# Patient Record
Sex: FEMALE | URBAN - METROPOLITAN AREA
[De-identification: names, ages, dates, MRNs, and addresses within clinical notes are randomized per-mention and may not be internally consistent; named-entity substitution may affect disease eponyms.]

---

## 2019-01-30 ENCOUNTER — NEW PATIENT (OUTPATIENT)
Dept: URBAN - METROPOLITAN AREA CLINIC 56 | Facility: CLINIC | Age: 64
End: 2019-01-30
Payer: COMMERCIAL

## 2019-01-30 DIAGNOSIS — E11.3513 DIABETES MELLITUS TYPE 2 WITH PROLIFERATIVE RETINO: Primary | ICD-10-CM

## 2019-01-30 PROCEDURE — 92250 FUNDUS PHOTOGRAPHY W/I&R: CPT | Performed by: OPTOMETRIST

## 2019-01-30 PROCEDURE — 92004 COMPRE OPH EXAM NEW PT 1/>: CPT | Performed by: OPTOMETRIST

## 2019-01-30 ASSESSMENT — VISUAL ACUITY
OD: 20/20
OS: 20/25

## 2019-01-30 ASSESSMENT — INTRAOCULAR PRESSURE
OD: 17
OS: 17

## 2019-01-30 ASSESSMENT — KERATOMETRY
OS: 42.78
OD: 42.81

## 2019-02-06 ENCOUNTER — FOLLOW UP ESTABLISHED (OUTPATIENT)
Dept: URBAN - METROPOLITAN AREA CLINIC 56 | Facility: CLINIC | Age: 64
End: 2019-02-06
Payer: COMMERCIAL

## 2019-02-06 PROCEDURE — 92134 CPTRZ OPH DX IMG PST SGM RTA: CPT | Performed by: OPHTHALMOLOGY

## 2019-02-06 PROCEDURE — 92235 FLUORESCEIN ANGRPH MLTIFRAME: CPT | Performed by: OPHTHALMOLOGY

## 2019-02-06 PROCEDURE — 92004 COMPRE OPH EXAM NEW PT 1/>: CPT | Performed by: OPHTHALMOLOGY

## 2019-02-06 ASSESSMENT — INTRAOCULAR PRESSURE
OS: 18
OD: 22

## 2019-02-18 ENCOUNTER — FOLLOW UP ESTABLISHED (OUTPATIENT)
Dept: URBAN - METROPOLITAN AREA CLINIC 56 | Facility: CLINIC | Age: 64
End: 2019-02-18
Payer: COMMERCIAL

## 2019-02-18 DIAGNOSIS — E11.3412 TYPE 2 DIAB WITH SEVERE NONP RTNOP WITH MACULAR EDEMA, L EYE: ICD-10-CM

## 2019-02-18 PROCEDURE — 67210 TREATMENT OF RETINAL LESION: CPT | Performed by: OPHTHALMOLOGY

## 2019-02-18 PROCEDURE — 92134 CPTRZ OPH DX IMG PST SGM RTA: CPT | Performed by: OPHTHALMOLOGY

## 2019-02-18 ASSESSMENT — INTRAOCULAR PRESSURE
OD: 23
OS: 20

## 2019-03-06 ENCOUNTER — FOLLOW UP ESTABLISHED (OUTPATIENT)
Dept: URBAN - METROPOLITAN AREA CLINIC 56 | Facility: CLINIC | Age: 64
End: 2019-03-06
Payer: COMMERCIAL

## 2019-03-06 DIAGNOSIS — Z79.84 LONG TERM (CURRENT) USE OF ORAL HYPOGLYCEMIC DRUGS: ICD-10-CM

## 2019-03-06 PROCEDURE — 92134 CPTRZ OPH DX IMG PST SGM RTA: CPT | Performed by: OPHTHALMOLOGY

## 2019-03-06 PROCEDURE — 67210 TREATMENT OF RETINAL LESION: CPT | Performed by: OPHTHALMOLOGY

## 2019-03-06 ASSESSMENT — INTRAOCULAR PRESSURE
OS: 16
OD: 15

## 2019-03-18 ENCOUNTER — FOLLOW UP ESTABLISHED (OUTPATIENT)
Dept: URBAN - METROPOLITAN AREA CLINIC 56 | Facility: CLINIC | Age: 64
End: 2019-03-18
Payer: COMMERCIAL

## 2019-03-18 PROCEDURE — 67228 TREATMENT X10SV RETINOPATHY: CPT | Performed by: OPHTHALMOLOGY

## 2019-03-18 PROCEDURE — 67028 INJECTION EYE DRUG: CPT | Performed by: OPHTHALMOLOGY

## 2019-03-18 PROCEDURE — 92134 CPTRZ OPH DX IMG PST SGM RTA: CPT | Performed by: OPHTHALMOLOGY

## 2019-03-18 ASSESSMENT — INTRAOCULAR PRESSURE
OS: 15
OD: 18

## 2019-04-03 ENCOUNTER — FOLLOW UP ESTABLISHED (OUTPATIENT)
Dept: URBAN - METROPOLITAN AREA CLINIC 56 | Facility: CLINIC | Age: 64
End: 2019-04-03
Payer: COMMERCIAL

## 2019-04-03 DIAGNOSIS — E11.3411 TYPE 2 DIAB WITH SEVERE NONP RTNOP WITH MACULAR EDEMA, R EYE: Primary | ICD-10-CM

## 2019-04-03 PROCEDURE — 92134 CPTRZ OPH DX IMG PST SGM RTA: CPT | Performed by: OPHTHALMOLOGY

## 2019-04-03 PROCEDURE — 67228 TREATMENT X10SV RETINOPATHY: CPT | Performed by: OPHTHALMOLOGY

## 2019-04-03 ASSESSMENT — INTRAOCULAR PRESSURE
OS: 18
OD: 19

## 2021-11-18 ENCOUNTER — OFFICE VISIT (OUTPATIENT)
Dept: URBAN - METROPOLITAN AREA CLINIC 8 | Facility: CLINIC | Age: 66
End: 2021-11-18
Payer: MEDICARE

## 2021-11-18 DIAGNOSIS — E11.9 DIABETES MELLITUS TYPE 2 WITHOUT MENTION OF COMPLICATION: ICD-10-CM

## 2021-11-18 DIAGNOSIS — H35.373 PUCKERING OF MACULA, BILATERAL: Primary | ICD-10-CM

## 2021-11-18 PROCEDURE — 99214 OFFICE O/P EST MOD 30 MIN: CPT | Performed by: OPHTHALMOLOGY

## 2021-11-18 PROCEDURE — 92134 CPTRZ OPH DX IMG PST SGM RTA: CPT | Performed by: OPHTHALMOLOGY

## 2021-11-18 ASSESSMENT — VISUAL ACUITY
OS: 20/40
OD: 20/25

## 2021-11-18 ASSESSMENT — INTRAOCULAR PRESSURE
OD: 20
OS: 20

## 2021-11-18 NOTE — IMPRESSION/PLAN
Impression: Puckering of macula, bilateral: H35.373. Plan: Recommend retina referral. Advised risks and benefits.

## 2021-11-18 NOTE — IMPRESSION/PLAN
Impression: Diabetes mellitus Type 2 without mention of complication: I62.1. Plan: Best treatment is to keep sugar under good control. Regular visits with PCP.

## 2021-11-23 ENCOUNTER — OFFICE VISIT (OUTPATIENT)
Dept: URBAN - METROPOLITAN AREA CLINIC 13 | Facility: CLINIC | Age: 66
End: 2021-11-23
Payer: MEDICARE

## 2021-11-23 DIAGNOSIS — Z96.1 PRESENCE OF INTRAOCULAR LENS: ICD-10-CM

## 2021-11-23 PROCEDURE — 92134 CPTRZ OPH DX IMG PST SGM RTA: CPT | Performed by: OPHTHALMOLOGY

## 2021-11-23 PROCEDURE — 92235 FLUORESCEIN ANGRPH MLTIFRAME: CPT | Performed by: OPHTHALMOLOGY

## 2021-11-23 PROCEDURE — 99204 OFFICE O/P NEW MOD 45 MIN: CPT | Performed by: OPHTHALMOLOGY

## 2021-11-23 ASSESSMENT — INTRAOCULAR PRESSURE
OS: 14
OD: 18

## 2021-11-23 NOTE — IMPRESSION/PLAN
Impression: Type 2 diab with severe nonp rtnop with macular edema, l eye: I91.7066 Left. Plan: --exam/OCT show center-involved DME
--FA shows perifoveal and juxtafoveal leaking MAs, no NVD/NVE
--r/b/a of anti-VEGF, focal laser, steroids, obs discussed --pt understands Avastin is considered off label for intraocular use --pt elects to proceed with Avastin, performed w/o complication
--plan series of 3 consecutive monthly injections followed by focal/grid laser --importance of tight BS/BP/lipid control discussed
--coordinate care with PCP to reduce systemic complications of DM

## 2021-11-23 NOTE — IMPRESSION/PLAN
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  Plan: --broad VMA with macular traction OD
--will consider PPV with MP at a later date

## 2021-11-23 NOTE — IMPRESSION/PLAN
Impression: Presence of intraocular lens: Z96.1.  Bilateral. Plan: --stable and centered OU, observe

## 2021-11-23 NOTE — IMPRESSION/PLAN
Impression: Type 2 diab with prolif diab rtnop with macular edema, r eye: E11.3511 Right. Plan: --exam/OCT shows center-involved DME OD
--FA shows leaking MAs, NVE, peripheral non-perfusion
--r/b/a of anti-VEGF, focal laser, PRP, steroids, obs discussed --pt understands Avastin is considered off label for intraocular use --pt elects to proceed with Avastin, performed w/o complication
--plan series of 3 consecutive monthly injections followed by PRP
--importance of tight BS/BP/lipid control discussed
--coordinate care with PCP to reduce systemic complications of DM

RTC 1 month for Avastin OU 2/3

## 2021-12-21 ENCOUNTER — PROCEDURE (OUTPATIENT)
Dept: URBAN - METROPOLITAN AREA CLINIC 13 | Facility: CLINIC | Age: 66
End: 2021-12-21
Payer: MEDICARE

## 2021-12-21 ASSESSMENT — INTRAOCULAR PRESSURE
OD: 17
OS: 15

## 2022-02-01 ENCOUNTER — PROCEDURE (OUTPATIENT)
Dept: URBAN - METROPOLITAN AREA CLINIC 13 | Facility: CLINIC | Age: 67
End: 2022-02-01
Payer: MEDICARE

## 2022-02-01 DIAGNOSIS — E11.3412 TYPE 2 DIABETES MELLITUS WITH SEVERE NONPROLIFERATIVE DIABETIC RETINOPATHY WITH MACULAR EDEMA, LEFT EYE: ICD-10-CM

## 2022-02-01 ASSESSMENT — INTRAOCULAR PRESSURE
OD: 21
OS: 20

## 2022-03-01 ENCOUNTER — OFFICE VISIT (OUTPATIENT)
Dept: URBAN - METROPOLITAN AREA CLINIC 13 | Facility: CLINIC | Age: 67
End: 2022-03-01
Payer: MEDICARE

## 2022-03-01 DIAGNOSIS — H43.821 VITREOMACULAR ADHESION, RIGHT EYE: ICD-10-CM

## 2022-03-01 DIAGNOSIS — E11.3511 TYPE 2 DIAB WITH PROLIF DIAB RTNOP WITH MACULAR EDEMA, R EYE: Primary | ICD-10-CM

## 2022-03-01 PROCEDURE — 92134 CPTRZ OPH DX IMG PST SGM RTA: CPT | Performed by: OPHTHALMOLOGY

## 2022-03-01 PROCEDURE — 99213 OFFICE O/P EST LOW 20 MIN: CPT | Performed by: OPHTHALMOLOGY

## 2022-03-01 ASSESSMENT — INTRAOCULAR PRESSURE
OD: 16
OS: 18

## 2022-03-01 NOTE — IMPRESSION/PLAN
Impression: Type 2 diab with prolif diab rtnop with macular edema, r eye: E11.3511 Right.
s/p Avastin OU x 3, last 02/01/22 Plan: --exam/OCT shows center-involved DME OD, improving s/p Avastin x 3
--FA from 11/23/21 showed leaking MAs, NVE, peripheral non-perfusion
--r/b/a of anti-VEGF, focal laser, PRP, steroids, obs discussed --pt understands Avastin is considered off label for intraocular use --pt elects to proceed with Avastin #4, performed w/o complication
--will require PRP OD at a later date --importance of tight BS/BP/lipid control discussed
--coordinate care with PCP to reduce systemic complications of DM

RTC 1 month for OCT OU re-eval Avastin vs Ozurdex

## 2022-03-01 NOTE — IMPRESSION/PLAN
Impression: Type 2 diab with severe nonp rtnop with macular edema, l eye: T73.6146 Left. Plan: --exam/OCT show center-involved DME, minimal improvement s/p Avastin x 3
--FA shows perifoveal and juxtafoveal leaking MAs, no NVD/NVE
--r/b/a of anti-VEGF, focal laser, steroids, obs discussed --pt understands Avastin is considered off label for intraocular use --pt elects to proceed with Avastin #4, performed w/o complication
--will consider intraocular steroid and/or focal/grid laser in future

## 2022-04-12 ENCOUNTER — OFFICE VISIT (OUTPATIENT)
Dept: URBAN - METROPOLITAN AREA CLINIC 13 | Facility: CLINIC | Age: 67
End: 2022-04-12
Payer: MEDICARE

## 2022-04-12 DIAGNOSIS — H43.821 VITREOMACULAR ADHESION, RIGHT EYE: ICD-10-CM

## 2022-04-12 DIAGNOSIS — Z96.1 PRESENCE OF INTRAOCULAR LENS: ICD-10-CM

## 2022-04-12 DIAGNOSIS — E11.3412 TYPE 2 DIAB WITH SEVERE NONP RTNOP WITH MACULAR EDEMA, L EYE: ICD-10-CM

## 2022-04-12 DIAGNOSIS — E11.3511 TYPE 2 DIABETES MELLITUS WITH PROLIFERATIVE DIABETIC RETINOPATHY WITH MACULAR EDEMA, RIGHT EYE: Primary | ICD-10-CM

## 2022-04-12 PROCEDURE — 92134 CPTRZ OPH DX IMG PST SGM RTA: CPT | Performed by: OPHTHALMOLOGY

## 2022-04-12 ASSESSMENT — INTRAOCULAR PRESSURE
OS: 16
OD: 16

## 2022-04-12 NOTE — IMPRESSION/PLAN
Impression: Type 2 diab with severe nonp rtnop with macular edema, l eye: T81.0205 Left. Plan: --exam/OCT show center-involved DME, minimal improvement s/p Avastin x 4
--FA from 11/23/21: perifoveal and juxtafoveal leaking MAs, no NVD/NVE
--r/b/a of anti-VEGF, focal laser, steroids, obs discussed --pt elects to proceed with Ozurdex #1, performed w/o complication
--will consider focal/grid laser in future

## 2022-04-12 NOTE — IMPRESSION/PLAN
Impression: Type 2 diab with prolif diab rtnop with macular edema, r eye: E11.3511 Right.
s/p Avastin OU x 4, last 03/01/2022 Plan: --exam/OCT show persistent center-involved DME OD s/p Avastin x 4
--FA from 11/23/21 showed leaking MAs, NVE, peripheral non-perfusion
--r/b/a of anti-VEGF, focal laser, PRP, steroids, obs discussed --pt elects to proceed with Ozurdex #1, performed w/o complication
--will require PRP OD at a later date --importance of tight BS/BP/lipid control discussed
--coordinate care with PCP to reduce systemic complications of DM

RTC 1 month for OCT OU re-eval Avastin vs Ozurdex

## 2022-05-10 ENCOUNTER — OFFICE VISIT (OUTPATIENT)
Dept: URBAN - METROPOLITAN AREA CLINIC 13 | Facility: CLINIC | Age: 67
End: 2022-05-10
Payer: MEDICARE

## 2022-05-10 DIAGNOSIS — Z96.1 PRESENCE OF INTRAOCULAR LENS: ICD-10-CM

## 2022-05-10 DIAGNOSIS — E11.3412 TYPE 2 DIAB WITH SEVERE NONP RTNOP WITH MACULAR EDEMA, L EYE: ICD-10-CM

## 2022-05-10 DIAGNOSIS — H40.053 OCULAR HYPERTENSION, BILATERAL: ICD-10-CM

## 2022-05-10 DIAGNOSIS — H43.821 VITREOMACULAR ADHESION, RIGHT EYE: ICD-10-CM

## 2022-05-10 DIAGNOSIS — E11.3511 TYPE 2 DIABETES MELLITUS WITH PROLIFERATIVE DIABETIC RETINOPATHY WITH MACULAR EDEMA, RIGHT EYE: Primary | ICD-10-CM

## 2022-05-10 PROCEDURE — 92134 CPTRZ OPH DX IMG PST SGM RTA: CPT | Performed by: OPHTHALMOLOGY

## 2022-05-10 RX ORDER — BRIMONIDINE TARTRATE 2 MG/ML
0.2 % SOLUTION/ DROPS OPHTHALMIC
Qty: 5 | Refills: 3 | Status: INACTIVE
Start: 2022-05-10 | End: 2022-08-07

## 2022-05-10 ASSESSMENT — INTRAOCULAR PRESSURE
OS: 28
OD: 24

## 2022-05-10 NOTE — IMPRESSION/PLAN
Impression: Type 2 diab with prolif diab rtnop with macular edema, r eye: E11.3511 Right.
s/p Avastin OU x 4, last 03/01/2022
s/p Ozurdex OU x 1, last 4/12/22 Plan: --exam/OCT show DME OD, slightly improved s/p Ozurdex x 1, s/p Avastin x 4
--FA from 11/23/21 showed leaking MAs, NVE, peripheral non-perfusion
--r/b/a of anti-VEGF, focal laser, PRP, steroids, obs discussed --pt elects to proceed with Avastin #5, performed w/o complication
--will schedule for PRP OD at a later date --importance of tight BS/BP/lipid control discussed
--coordinate care with PCP to reduce systemic complications of DM

RTC 1 month for PRP OD

## 2022-05-10 NOTE — IMPRESSION/PLAN
Impression: Ocular hypertension, bilateral: H40.053.  Bilateral. Plan: --likely steroid-response ocular hypertension OU
--recommend starting brim 0.2 BID OU, Rx sent

## 2022-05-10 NOTE — IMPRESSION/PLAN
Impression: Type 2 diab with severe nonp rtnop with macular edema, l eye: W24.8618 Left. Plan: --exam/OCT show center-involved DME, some improvement s/p Ozurdex, s/p Avastin x 4
--FA from 11/23/21: perifoveal and juxtafoveal leaking MAs, no NVD/NVE
--r/b/a of anti-VEGF, focal laser, steroids, obs discussed --pt elects to proceed with Avastin #5, performed w/o complication
--will consider focal/grid laser in future

## 2022-06-07 ENCOUNTER — PROCEDURE (OUTPATIENT)
Dept: URBAN - METROPOLITAN AREA CLINIC 13 | Facility: CLINIC | Age: 67
End: 2022-06-07
Payer: MEDICARE

## 2022-06-07 DIAGNOSIS — E11.3511 TYPE 2 DIABETES MELLITUS WITH PROLIFERATIVE DIABETIC RETINOPATHY WITH MACULAR EDEMA, RIGHT EYE: Primary | ICD-10-CM

## 2022-06-07 PROCEDURE — 67228 TREATMENT X10SV RETINOPATHY: CPT | Performed by: OPHTHALMOLOGY

## 2022-06-07 ASSESSMENT — INTRAOCULAR PRESSURE
OS: 21
OD: 21

## 2022-07-19 ENCOUNTER — OFFICE VISIT (OUTPATIENT)
Dept: URBAN - METROPOLITAN AREA CLINIC 13 | Facility: CLINIC | Age: 67
End: 2022-07-19
Payer: MEDICARE

## 2022-07-19 DIAGNOSIS — Z96.1 PRESENCE OF INTRAOCULAR LENS: ICD-10-CM

## 2022-07-19 DIAGNOSIS — E11.3412 TYPE 2 DIAB WITH SEVERE NONP RTNOP WITH MACULAR EDEMA, L EYE: ICD-10-CM

## 2022-07-19 DIAGNOSIS — E11.3511 TYPE 2 DIABETES MELLITUS WITH PROLIFERATIVE DIABETIC RETINOPATHY WITH MACULAR EDEMA, RIGHT EYE: Primary | ICD-10-CM

## 2022-07-19 DIAGNOSIS — H43.821 VITREOMACULAR ADHESION, RIGHT EYE: ICD-10-CM

## 2022-07-19 PROCEDURE — 99213 OFFICE O/P EST LOW 20 MIN: CPT | Performed by: OPHTHALMOLOGY

## 2022-07-19 PROCEDURE — 92134 CPTRZ OPH DX IMG PST SGM RTA: CPT | Performed by: OPHTHALMOLOGY

## 2022-07-19 ASSESSMENT — INTRAOCULAR PRESSURE
OS: 27
OD: 25

## 2022-07-19 NOTE — IMPRESSION/PLAN
Impression: Type 2 diab with severe nonp rtnop with macular edema, l eye: T02.1263 Left. Plan: --exam/OCT show improving DME OS, s/p Ozurdex x 1, s/p Avastin x 5
--FA from 11/23/21: perifoveal and juxtafoveal leaking MAs, no NVD/NVE
--r/b/a of anti-VEGF, focal laser, steroids, obs discussed --pt elects to proceed with Avastin #6, performed w/o complication
--will consider focal/grid laser in future

## 2022-07-19 NOTE — IMPRESSION/PLAN
Impression: Type 2 diab with prolif diab rtnop with macular edema, r eye: E11.3511 Right.
s/p Avastin OU x 5, last 05/10/2022
s/p Ozurdex OU x 1, last 4/12/22
s/p PRP OD 06/07/22 Plan: --exam/OCT show stable DME OD, s/p Ozurdex x 1, s/p Avastin x 5
--FA from 11/23/21 showed leaking MAs, NVE, peripheral non-perfusion
--r/b/a of anti-VEGF, focal laser, PRP, steroids, obs discussed --pt elects to proceed with Avastin #6, performed w/o complication
--importance of tight BS/BP/lipid control discussed
--coordinate care with PCP to reduce systemic complications of DM

RTC 8 weeks for DFE/OCT OU re-eval

## 2022-09-13 ENCOUNTER — OFFICE VISIT (OUTPATIENT)
Dept: URBAN - METROPOLITAN AREA CLINIC 13 | Facility: CLINIC | Age: 67
End: 2022-09-13
Payer: MEDICARE

## 2022-09-13 DIAGNOSIS — H43.821 VITREOMACULAR ADHESION, RIGHT EYE: ICD-10-CM

## 2022-09-13 DIAGNOSIS — Z96.1 PRESENCE OF INTRAOCULAR LENS: ICD-10-CM

## 2022-09-13 DIAGNOSIS — E11.3511 TYPE 2 DIABETES MELLITUS WITH PROLIFERATIVE DIABETIC RETINOPATHY WITH MACULAR EDEMA, RIGHT EYE: Primary | ICD-10-CM

## 2022-09-13 DIAGNOSIS — E11.3412 TYPE 2 DIAB WITH SEVERE NONP RTNOP WITH MACULAR EDEMA, L EYE: ICD-10-CM

## 2022-09-13 PROCEDURE — 92134 CPTRZ OPH DX IMG PST SGM RTA: CPT | Performed by: OPHTHALMOLOGY

## 2022-09-13 ASSESSMENT — INTRAOCULAR PRESSURE
OS: 18
OD: 22

## 2022-09-13 NOTE — IMPRESSION/PLAN
Impression: Type 2 diab with severe nonp rtnop with macular edema, l eye: A15.4706 Left. Plan: --exam/OCT show stable DME OS, s/p Ozurdex x 1, s/p Avastin x 6
--FA from 11/23/21: perifoveal and juxtafoveal leaking MAs, no NVD/NVE
--r/b/a of anti-VEGF, focal laser, steroids, obs discussed --pt elects to proceed with Avastin #7, performed w/o complication
--will add focal/grid laser in future

## 2022-09-13 NOTE — IMPRESSION/PLAN
Impression: Type 2 diab with prolif diab rtnop with macular edema, r eye: E11.3511 Right.
s/p Avastin OU x 6, last 07/19/2022
s/p Ozurdex OU x 1, last 4/12/22
s/p PRP OD 06/07/22 Plan: --exam/OCT reveal persistent DME OD, s/p Ozurdex x 1, s/p Avastin x 6
--FA from 11/23/21 showed leaking MAs, NVE, peripheral non-perfusion
--r/b/a of anti-VEGF, focal laser, PRP, steroids, obs discussed --pt elects to proceed with Avastin #7, performed w/o complication
--importance of tight BS/BP/lipid control discussed
--will add focal laser at a later date to help reduce treatment burden
--coordinate care with PCP to reduce systemic complications of DM

RTC 4 weeks for focal laser OS (see below) RTC 8 weeks for DFE/OCT OU re-eval

## 2022-10-25 ENCOUNTER — PROCEDURE (OUTPATIENT)
Dept: URBAN - METROPOLITAN AREA CLINIC 13 | Facility: CLINIC | Age: 67
End: 2022-10-25
Payer: MEDICARE

## 2022-10-25 DIAGNOSIS — E11.3412 TYPE 2 DIABETES MELLITUS WITH SEVERE NONPROLIFERATIVE DIABETIC RETINOPATHY WITH MACULAR EDEMA, LEFT EYE: Primary | ICD-10-CM

## 2022-10-25 PROCEDURE — 67210 TREATMENT OF RETINAL LESION: CPT | Performed by: OPHTHALMOLOGY

## 2022-10-25 ASSESSMENT — INTRAOCULAR PRESSURE
OD: 17
OS: 20

## 2022-11-22 ENCOUNTER — OFFICE VISIT (OUTPATIENT)
Dept: URBAN - METROPOLITAN AREA CLINIC 13 | Facility: CLINIC | Age: 67
End: 2022-11-22
Payer: MEDICARE

## 2022-11-22 DIAGNOSIS — H43.821 VITREOMACULAR ADHESION, RIGHT EYE: ICD-10-CM

## 2022-11-22 DIAGNOSIS — E11.3412 TYPE 2 DIABETES MELLITUS WITH SEVERE NONPROLIFERATIVE DIABETIC RETINOPATHY WITH MACULAR EDEMA, LEFT EYE: ICD-10-CM

## 2022-11-22 DIAGNOSIS — E11.3511 TYPE 2 DIAB WITH PROLIF DIAB RTNOP WITH MACULAR EDEMA, R EYE: Primary | ICD-10-CM

## 2022-11-22 DIAGNOSIS — Z96.1 PRESENCE OF INTRAOCULAR LENS: ICD-10-CM

## 2022-11-22 PROCEDURE — 92134 CPTRZ OPH DX IMG PST SGM RTA: CPT | Performed by: OPHTHALMOLOGY

## 2022-11-22 PROCEDURE — 99213 OFFICE O/P EST LOW 20 MIN: CPT | Performed by: OPHTHALMOLOGY

## 2022-11-22 ASSESSMENT — INTRAOCULAR PRESSURE
OS: 24
OD: 21

## 2022-11-22 NOTE — IMPRESSION/PLAN
Impression: Type 2 diab with severe nonp rtnop with macular edema, l eye: T56.8054 Left. s/p focal laser Plan: --exam/OCT show stable DME OS, s/p Ozurdex x 1, s/p Avastin x 7
--FA from 11/23/21: perifoveal and juxtafoveal leaking MAs, no NVD/NVE
--r/b/a of anti-VEGF, focal laser, steroids, obs discussed --pt elects to proceed with Avastin #8, performed w/o complication

## 2022-11-22 NOTE — IMPRESSION/PLAN
Impression: Type 2 diab with prolif diab rtnop with macular edema, r eye: E11.3511 Right.
s/p Avastin OU x 7, last 09/13/2022
s/p Ozurdex OU x 1, last 4/12/22
s/p PRP OD 06/07/22 Plan: --exam/OCT reveal stable DME OD, s/p Ozurdex x 1, s/p Avastin x 7
--FA from 11/23/21 showed leaking MAs, NVE, peripheral non-perfusion
--r/b/a of anti-VEGF, focal laser, PRP, steroids, obs discussed --pt elects to proceed with Avastin #8, performed w/o complication
--importance of tight BS/BP/lipid control discussed
--will add focal laser OD at a later date to help reduce treatment burden
--coordinate care with PCP to reduce systemic complications of DM

RTC 8 weeks for DFE/OCT OU re-eval

## 2023-01-17 ENCOUNTER — OFFICE VISIT (OUTPATIENT)
Dept: URBAN - METROPOLITAN AREA CLINIC 13 | Facility: CLINIC | Age: 68
End: 2023-01-17
Payer: MEDICARE

## 2023-01-17 DIAGNOSIS — E11.3412 TYPE 2 DIAB WITH SEVERE NONP RTNOP WITH MACULAR EDEMA, L EYE: ICD-10-CM

## 2023-01-17 DIAGNOSIS — Z96.1 PRESENCE OF INTRAOCULAR LENS: ICD-10-CM

## 2023-01-17 DIAGNOSIS — E11.3511 TYPE 2 DIABETES MELLITUS WITH PROLIFERATIVE DIABETIC RETINOPATHY WITH MACULAR EDEMA, RIGHT EYE: Primary | ICD-10-CM

## 2023-01-17 DIAGNOSIS — H43.821 VITREOMACULAR ADHESION, RIGHT EYE: ICD-10-CM

## 2023-01-17 PROCEDURE — 99213 OFFICE O/P EST LOW 20 MIN: CPT | Performed by: OPHTHALMOLOGY

## 2023-01-17 PROCEDURE — 67028 INJECTION EYE DRUG: CPT | Performed by: OPHTHALMOLOGY

## 2023-01-17 PROCEDURE — 92134 CPTRZ OPH DX IMG PST SGM RTA: CPT | Performed by: OPHTHALMOLOGY

## 2023-01-17 ASSESSMENT — INTRAOCULAR PRESSURE
OS: 15
OD: 14

## 2023-01-17 NOTE — IMPRESSION/PLAN
Impression: Type 2 diab with prolif diab rtnop with macular edema, r eye: E11.3511 Right.
s/p Avastin OU x 8, last 11/22/2022
s/p Ozurdex OU x 1, last 4/12/22
s/p PRP OD 06/07/22 Plan: --exam/OCT reveal stable DME OD, s/p Ozurdex x 1, s/p Avastin x 8
--FA from 11/23/21 showed leaking MAs, NVE, peripheral non-perfusion
--r/b/a of anti-VEGF, focal laser, PRP, steroids, obs discussed --pt elects to proceed with Avastin #9, performed w/o complication
--importance of tight BS/BP/lipid control discussed
--will add focal laser OD at a later date to help reduce treatment burden
--coordinate care with PCP to reduce systemic complications of DM

RTC 4 weeks for focal laser x DME OD
RTC 10 weeks for DFE/OCT OU re-eval

## 2023-01-17 NOTE — IMPRESSION/PLAN
Impression: Type 2 diab with severe nonp rtnop with macular edema, l eye: W38.0236 Left. s/p focal laser 10/25/22 Plan: --exam/OCT show stable DME OS, s/p Ozurdex x 1, s/p Avastin x 8
--FA from 11/23/21: perifoveal and juxtafoveal leaking MAs, no NVD/NVE
--r/b/a of anti-VEGF, focal laser, steroids, obs discussed --pt elects to proceed with Avastin #9, performed w/o complication

## 2023-03-22 ENCOUNTER — OFFICE VISIT (OUTPATIENT)
Dept: URBAN - METROPOLITAN AREA CLINIC 54 | Facility: CLINIC | Age: 68
End: 2023-03-22
Payer: MEDICARE

## 2023-03-22 DIAGNOSIS — E11.3511 TYPE 2 DIAB WITH PROLIF DIAB RTNOP WITH MACULAR EDEMA, R EYE: Primary | ICD-10-CM

## 2023-03-22 DIAGNOSIS — Z96.1 PRESENCE OF INTRAOCULAR LENS: ICD-10-CM

## 2023-03-22 DIAGNOSIS — E11.3412 TYPE 2 DIAB WITH SEVERE NONP RTNOP WITH MACULAR EDEMA, L EYE: ICD-10-CM

## 2023-03-22 DIAGNOSIS — H43.821 VITREOMACULAR ADHESION, RIGHT EYE: ICD-10-CM

## 2023-03-22 PROCEDURE — 92134 CPTRZ OPH DX IMG PST SGM RTA: CPT | Performed by: OPHTHALMOLOGY

## 2023-03-22 PROCEDURE — 67028 INJECTION EYE DRUG: CPT | Performed by: OPHTHALMOLOGY

## 2023-03-22 ASSESSMENT — INTRAOCULAR PRESSURE
OS: 22
OD: 21

## 2023-03-22 NOTE — IMPRESSION/PLAN
Impression: Type 2 diab with prolif diab rtnop with macular edema, r eye: E11.3511 Right.
s/p Avastin OU x 8, last 11/22/2022
s/p Ozurdex OU x 1, last 4/12/22
s/p PRP OD 06/07/22 Plan: She feels her vision is stable. OCT shows VMT/CME OD and trace edema OS. We discussed the findings and her edema OD is likely multifactorial from DME and VMT. I recommend we try avastin and if no improvement, consider PPV/MP. Avastin injected OD without complication after discussing the R/IB/A in detail. 
thanks [facility] RTC 2 months OCT OU; poss avastin

## 2023-03-22 NOTE — IMPRESSION/PLAN
Impression: Type 2 diab with severe nonp rtnop with macular edema, l eye: A98.4416 Left. 
s/p focal laser 10/25/22 Plan: --exam/OCT show stable DME OS, s/p Ozurdex x 1, s/p Avastin x 8
--FA from 11/23/21: perifoveal and juxtafoveal leaking MAs, no NVD/NVE
rec observation today

## 2023-05-17 ENCOUNTER — OFFICE VISIT (OUTPATIENT)
Dept: URBAN - METROPOLITAN AREA CLINIC 54 | Facility: CLINIC | Age: 68
End: 2023-05-17
Payer: MEDICARE

## 2023-05-17 DIAGNOSIS — H43.821 VITREOMACULAR ADHESION, RIGHT EYE: ICD-10-CM

## 2023-05-17 DIAGNOSIS — Z96.1 PRESENCE OF INTRAOCULAR LENS: ICD-10-CM

## 2023-05-17 DIAGNOSIS — E11.3412 TYPE 2 DIAB WITH SEVERE NONP RTNOP WITH MACULAR EDEMA, L EYE: ICD-10-CM

## 2023-05-17 DIAGNOSIS — E11.3511 TYPE 2 DIAB WITH PROLIF DIAB RTNOP WITH MACULAR EDEMA, R EYE: Primary | ICD-10-CM

## 2023-05-17 PROCEDURE — 99213 OFFICE O/P EST LOW 20 MIN: CPT | Performed by: OPHTHALMOLOGY

## 2023-05-17 PROCEDURE — 92134 CPTRZ OPH DX IMG PST SGM RTA: CPT | Performed by: OPHTHALMOLOGY

## 2023-05-17 ASSESSMENT — INTRAOCULAR PRESSURE
OD: 14
OS: 19

## 2023-05-17 NOTE — IMPRESSION/PLAN
Impression: Type 2 diab with severe nonp rtnop with macular edema, l eye: G66.9789 Left. 
s/p focal laser 10/25/22 Plan: --exam/OCT show stable DME OS, s/p Ozurdex x 1, s/p Avastin x 8
--FA from 11/23/21: perifoveal and juxtafoveal leaking MAs, no NVD/NVE
rec observation today

## 2023-05-17 NOTE — IMPRESSION/PLAN
Impression: Type 2 diab with prolif diab rtnop with macular edema, r eye: E11.3511 Right.
s/p Avastin OU x 8, last 11/22/2022
s/p Ozurdex OU x 1, last 4/12/22
s/p PRP OD 06/07/22 Plan: She feels her vision is stable. OCT shows VMT/CME OD and trace edema OS. We discussed the findings and her edema OD is likely multifactorial from DME and VMT. There was no improvement after recent avastin injection OD. We discussed PPV/MP OD, but I recommend observation today given her good vision. thanks [employer] RTC 8-10 weeks OCT OU; poss avastin

## 2023-07-26 ENCOUNTER — OFFICE VISIT (OUTPATIENT)
Dept: URBAN - METROPOLITAN AREA CLINIC 54 | Facility: CLINIC | Age: 68
End: 2023-07-26
Payer: MEDICARE

## 2023-07-26 DIAGNOSIS — E11.3412 TYPE 2 DIAB WITH SEVERE NONP RTNOP WITH MACULAR EDEMA, L EYE: ICD-10-CM

## 2023-07-26 DIAGNOSIS — Z96.1 PRESENCE OF INTRAOCULAR LENS: ICD-10-CM

## 2023-07-26 DIAGNOSIS — H43.821 VITREOMACULAR ADHESION, RIGHT EYE: ICD-10-CM

## 2023-07-26 DIAGNOSIS — E11.3511 TYPE 2 DIAB WITH PROLIF DIAB RTNOP WITH MACULAR EDEMA, R EYE: Primary | ICD-10-CM

## 2023-07-26 PROCEDURE — 99213 OFFICE O/P EST LOW 20 MIN: CPT | Performed by: OPHTHALMOLOGY

## 2023-07-26 PROCEDURE — 92134 CPTRZ OPH DX IMG PST SGM RTA: CPT | Performed by: OPHTHALMOLOGY

## 2023-07-26 ASSESSMENT — INTRAOCULAR PRESSURE
OD: 18
OS: 24

## 2023-08-31 ENCOUNTER — OFFICE VISIT (OUTPATIENT)
Dept: URBAN - METROPOLITAN AREA CLINIC 54 | Facility: CLINIC | Age: 68
End: 2023-08-31
Payer: MEDICARE

## 2023-08-31 DIAGNOSIS — H43.11 VITREOUS HEMORRHAGE, RIGHT EYE: ICD-10-CM

## 2023-08-31 DIAGNOSIS — E11.3511 TYPE 2 DIAB WITH PROLIF DIAB RTNOP WITH MACULAR EDEMA, R EYE: Primary | ICD-10-CM

## 2023-08-31 DIAGNOSIS — E11.3412 TYPE 2 DIAB WITH SEVERE NONP RTNOP WITH MACULAR EDEMA, L EYE: ICD-10-CM

## 2023-08-31 DIAGNOSIS — H43.821 VITREOMACULAR ADHESION, RIGHT EYE: ICD-10-CM

## 2023-08-31 DIAGNOSIS — Z96.1 PRESENCE OF INTRAOCULAR LENS: ICD-10-CM

## 2023-08-31 PROCEDURE — 92134 CPTRZ OPH DX IMG PST SGM RTA: CPT | Performed by: OPHTHALMOLOGY

## 2023-08-31 PROCEDURE — 99214 OFFICE O/P EST MOD 30 MIN: CPT | Performed by: OPHTHALMOLOGY

## 2023-08-31 ASSESSMENT — INTRAOCULAR PRESSURE
OS: 22
OD: 19

## 2023-10-06 ENCOUNTER — OFFICE VISIT (OUTPATIENT)
Dept: URBAN - METROPOLITAN AREA CLINIC 13 | Facility: CLINIC | Age: 68
End: 2023-10-06
Payer: MEDICARE

## 2023-10-06 DIAGNOSIS — E11.3412 TYPE 2 DIAB WITH SEVERE NONP RTNOP WITH MACULAR EDEMA, L EYE: ICD-10-CM

## 2023-10-06 DIAGNOSIS — H43.821 VITREOMACULAR ADHESION, RIGHT EYE: ICD-10-CM

## 2023-10-06 DIAGNOSIS — E11.3511 TYPE 2 DIAB WITH PROLIF DIAB RTNOP WITH MACULAR EDEMA, R EYE: Primary | ICD-10-CM

## 2023-10-06 DIAGNOSIS — H43.11 VITREOUS HEMORRHAGE, RIGHT EYE: ICD-10-CM

## 2023-10-06 PROCEDURE — 67028 INJECTION EYE DRUG: CPT | Performed by: OPHTHALMOLOGY

## 2023-10-06 PROCEDURE — 92134 CPTRZ OPH DX IMG PST SGM RTA: CPT | Performed by: OPHTHALMOLOGY

## 2023-10-06 ASSESSMENT — INTRAOCULAR PRESSURE
OS: 23
OD: 19

## 2023-11-29 ENCOUNTER — OFFICE VISIT (OUTPATIENT)
Dept: URBAN - METROPOLITAN AREA CLINIC 54 | Facility: CLINIC | Age: 68
End: 2023-11-29
Payer: MEDICARE

## 2023-11-29 DIAGNOSIS — E11.3412 TYPE 2 DIAB WITH SEVERE NONP RTNOP WITH MACULAR EDEMA, L EYE: ICD-10-CM

## 2023-11-29 DIAGNOSIS — H43.821 VITREOMACULAR ADHESION, RIGHT EYE: ICD-10-CM

## 2023-11-29 PROCEDURE — 92134 CPTRZ OPH DX IMG PST SGM RTA: CPT | Performed by: OPHTHALMOLOGY

## 2023-11-29 PROCEDURE — 67028 INJECTION EYE DRUG: CPT | Performed by: OPHTHALMOLOGY

## 2023-11-29 ASSESSMENT — INTRAOCULAR PRESSURE
OD: 17
OS: 21

## 2024-02-07 ENCOUNTER — OFFICE VISIT (OUTPATIENT)
Dept: URBAN - METROPOLITAN AREA CLINIC 54 | Facility: CLINIC | Age: 69
End: 2024-02-07
Payer: MEDICARE

## 2024-02-07 DIAGNOSIS — E11.3511 TYPE 2 DIAB WITH PROLIF DIAB RTNOP WITH MACULAR EDEMA, R EYE: Primary | ICD-10-CM

## 2024-02-07 PROCEDURE — 92134 CPTRZ OPH DX IMG PST SGM RTA: CPT | Performed by: OPHTHALMOLOGY

## 2024-02-07 PROCEDURE — 67028 INJECTION EYE DRUG: CPT | Performed by: OPHTHALMOLOGY

## 2024-02-07 PROCEDURE — 99213 OFFICE O/P EST LOW 20 MIN: CPT | Performed by: OPHTHALMOLOGY

## 2024-02-07 ASSESSMENT — INTRAOCULAR PRESSURE
OS: 19
OD: 15

## 2024-05-01 ENCOUNTER — OFFICE VISIT (OUTPATIENT)
Dept: URBAN - METROPOLITAN AREA CLINIC 54 | Facility: CLINIC | Age: 69
End: 2024-05-01
Payer: MEDICARE

## 2024-05-01 DIAGNOSIS — E11.3511 TYPE 2 DIABETES MELLITUS WITH PROLIFERATIVE DIABETIC RETINOPATHY WITH MACULAR EDEMA, RIGHT EYE: Primary | ICD-10-CM

## 2024-05-01 PROCEDURE — 92134 CPTRZ OPH DX IMG PST SGM RTA: CPT | Performed by: OPHTHALMOLOGY

## 2024-05-01 PROCEDURE — 67028 INJECTION EYE DRUG: CPT | Performed by: OPHTHALMOLOGY

## 2024-05-01 ASSESSMENT — INTRAOCULAR PRESSURE
OS: 25
OD: 21

## 2024-08-06 ENCOUNTER — OFFICE VISIT (OUTPATIENT)
Dept: URBAN - METROPOLITAN AREA CLINIC 7 | Facility: CLINIC | Age: 69
End: 2024-08-06
Payer: MEDICARE

## 2024-08-06 DIAGNOSIS — E11.3511 TYPE 2 DIABETES MELLITUS WITH PROLIFERATIVE DIABETIC RETINOPATHY WITH MACULAR EDEMA, RIGHT EYE: Primary | ICD-10-CM

## 2024-08-06 PROCEDURE — 92134 CPTRZ OPH DX IMG PST SGM RTA: CPT | Performed by: OPHTHALMOLOGY

## 2024-08-06 PROCEDURE — 67028 INJECTION EYE DRUG: CPT | Performed by: OPHTHALMOLOGY

## 2024-08-06 ASSESSMENT — INTRAOCULAR PRESSURE
OD: 18
OS: 19

## 2024-09-23 ENCOUNTER — OFFICE VISIT (OUTPATIENT)
Dept: URBAN - METROPOLITAN AREA CLINIC 54 | Facility: CLINIC | Age: 69
End: 2024-09-23
Payer: MEDICARE

## 2024-09-23 DIAGNOSIS — E11.3511 TYPE 2 DIABETES MELLITUS WITH PROLIFERATIVE DIABETIC RETINOPATHY WITH MACULAR EDEMA, RIGHT EYE: Primary | ICD-10-CM

## 2024-09-23 DIAGNOSIS — E11.3412 TYPE 2 DIAB WITH SEVERE NONP RTNOP WITH MACULAR EDEMA, L EYE: ICD-10-CM

## 2024-09-23 PROCEDURE — 92134 CPTRZ OPH DX IMG PST SGM RTA: CPT | Performed by: OPHTHALMOLOGY

## 2024-09-23 PROCEDURE — 76512 OPH US DX B-SCAN: CPT | Performed by: OPHTHALMOLOGY

## 2024-09-23 PROCEDURE — 67028 INJECTION EYE DRUG: CPT | Performed by: OPHTHALMOLOGY

## 2024-09-23 PROCEDURE — 99214 OFFICE O/P EST MOD 30 MIN: CPT | Performed by: OPHTHALMOLOGY

## 2024-09-23 ASSESSMENT — INTRAOCULAR PRESSURE
OD: 15
OS: 19

## 2024-09-28 ENCOUNTER — POST-OPERATIVE VISIT (OUTPATIENT)
Dept: URBAN - METROPOLITAN AREA CLINIC 7 | Facility: CLINIC | Age: 69
End: 2024-09-28
Payer: MEDICARE

## 2024-09-28 DIAGNOSIS — H35.373 PUCKERING OF MACULA, BILATERAL: ICD-10-CM

## 2024-09-28 DIAGNOSIS — H43.11 VITREOUS HEMORRHAGE, RIGHT EYE: Primary | ICD-10-CM

## 2024-09-28 PROCEDURE — 99024 POSTOP FOLLOW-UP VISIT: CPT | Performed by: STUDENT IN AN ORGANIZED HEALTH CARE EDUCATION/TRAINING PROGRAM

## 2024-09-28 ASSESSMENT — INTRAOCULAR PRESSURE
OS: 18
OD: 9

## 2024-10-02 ENCOUNTER — POST-OPERATIVE VISIT (OUTPATIENT)
Dept: URBAN - METROPOLITAN AREA CLINIC 54 | Facility: CLINIC | Age: 69
End: 2024-10-02
Payer: MEDICARE

## 2024-10-02 DIAGNOSIS — H43.11 VITREOUS HEMORRHAGE, RIGHT EYE: Primary | ICD-10-CM

## 2024-10-02 PROCEDURE — 99024 POSTOP FOLLOW-UP VISIT: CPT | Performed by: OPHTHALMOLOGY

## 2024-10-02 ASSESSMENT — INTRAOCULAR PRESSURE
OD: 13
OS: 12

## 2024-11-11 ENCOUNTER — POST-OPERATIVE VISIT (OUTPATIENT)
Dept: URBAN - METROPOLITAN AREA CLINIC 54 | Facility: CLINIC | Age: 69
End: 2024-11-11
Payer: MEDICARE

## 2024-11-11 DIAGNOSIS — E11.3412 TYPE 2 DIABETES MELLITUS WITH SEVERE NONPROLIFERATIVE DIABETIC RETINOPATHY WITH MACULAR EDEMA, LEFT EYE: Primary | ICD-10-CM

## 2024-11-11 DIAGNOSIS — H43.11 VITREOUS HEMORRHAGE, RIGHT EYE: ICD-10-CM

## 2024-11-11 PROCEDURE — 99024 POSTOP FOLLOW-UP VISIT: CPT | Performed by: OPHTHALMOLOGY

## 2024-11-11 PROCEDURE — 92134 CPTRZ OPH DX IMG PST SGM RTA: CPT | Performed by: OPHTHALMOLOGY

## 2024-11-11 ASSESSMENT — INTRAOCULAR PRESSURE
OD: 16
OS: 19

## 2025-02-19 ENCOUNTER — OFFICE VISIT (OUTPATIENT)
Dept: URBAN - METROPOLITAN AREA CLINIC 54 | Facility: CLINIC | Age: 70
End: 2025-02-19
Payer: MEDICARE

## 2025-02-19 DIAGNOSIS — E11.3511 TYPE 2 DIAB WITH PROLIF DIAB RTNOP WITH MACULAR EDEMA, R EYE: Primary | ICD-10-CM

## 2025-02-19 DIAGNOSIS — H43.11 VITREOUS HEMORRHAGE, RIGHT EYE: ICD-10-CM

## 2025-02-19 DIAGNOSIS — E11.3412 TYPE 2 DIABETES MELLITUS WITH SEVERE NONPROLIFERATIVE DIABETIC RETINOPATHY WITH MACULAR EDEMA, LEFT EYE: ICD-10-CM

## 2025-02-19 PROCEDURE — 92134 CPTRZ OPH DX IMG PST SGM RTA: CPT | Performed by: OPHTHALMOLOGY

## 2025-02-19 PROCEDURE — 99213 OFFICE O/P EST LOW 20 MIN: CPT | Performed by: OPHTHALMOLOGY

## 2025-02-19 ASSESSMENT — INTRAOCULAR PRESSURE
OS: 21
OD: 15

## 2025-06-18 ENCOUNTER — OFFICE VISIT (OUTPATIENT)
Dept: URBAN - METROPOLITAN AREA CLINIC 54 | Facility: CLINIC | Age: 70
End: 2025-06-18
Payer: MEDICARE

## 2025-06-18 DIAGNOSIS — H43.11 VITREOUS HEMORRHAGE, RIGHT EYE: ICD-10-CM

## 2025-06-18 DIAGNOSIS — E11.3412 TYPE 2 DIAB WITH SEVERE NONP RTNOP WITH MACULAR EDEMA, L EYE: ICD-10-CM

## 2025-06-18 DIAGNOSIS — E11.3511 TYPE 2 DIABETES MELLITUS WITH PROLIFERATIVE DIABETIC RETINOPATHY WITH MACULAR EDEMA, RIGHT EYE: Primary | ICD-10-CM

## 2025-06-18 PROCEDURE — 99213 OFFICE O/P EST LOW 20 MIN: CPT | Performed by: OPHTHALMOLOGY

## 2025-06-18 PROCEDURE — 92134 CPTRZ OPH DX IMG PST SGM RTA: CPT | Performed by: OPHTHALMOLOGY

## 2025-06-18 ASSESSMENT — INTRAOCULAR PRESSURE
OS: 18
OD: 14